# Patient Record
Sex: FEMALE | URBAN - METROPOLITAN AREA
[De-identification: names, ages, dates, MRNs, and addresses within clinical notes are randomized per-mention and may not be internally consistent; named-entity substitution may affect disease eponyms.]

---

## 2018-10-05 ENCOUNTER — APPOINTMENT (RX ONLY)
Dept: URBAN - METROPOLITAN AREA CLINIC 20 | Facility: CLINIC | Age: 83
Setting detail: DERMATOLOGY
End: 2018-10-05

## 2018-10-05 DIAGNOSIS — L57.8 OTHER SKIN CHANGES DUE TO CHRONIC EXPOSURE TO NONIONIZING RADIATION: ICD-10-CM

## 2018-10-05 DIAGNOSIS — D18.0 HEMANGIOMA: ICD-10-CM

## 2018-10-05 DIAGNOSIS — L81.4 OTHER MELANIN HYPERPIGMENTATION: ICD-10-CM

## 2018-10-05 DIAGNOSIS — L82.1 OTHER SEBORRHEIC KERATOSIS: ICD-10-CM

## 2018-10-05 DIAGNOSIS — D22 MELANOCYTIC NEVI: ICD-10-CM

## 2018-10-05 PROBLEM — D18.01 HEMANGIOMA OF SKIN AND SUBCUTANEOUS TISSUE: Status: ACTIVE | Noted: 2018-10-05

## 2018-10-05 PROBLEM — D22.9 MELANOCYTIC NEVI, UNSPECIFIED: Status: ACTIVE | Noted: 2018-10-05

## 2018-10-05 PROCEDURE — 99214 OFFICE O/P EST MOD 30 MIN: CPT

## 2018-10-05 PROCEDURE — ? COUNSELING

## 2018-10-05 PROCEDURE — ? SUNSCREEN RECOMMENDATIONS

## 2018-10-05 NOTE — PROCEDURE: SUNSCREEN RECOMMENDATIONS
General Sunscreen Counseling: I recommended a broad spectrum sunscreen with at least SPF of 30, but higher is better.  I explained that SPF 30 sunscreens block approximately 97 percent of the sun's harmful rays in vitro, but in practice a higher SPF offers more protection from sun exposure as most people don't use the density of application to get the number on the body.  Sunscreens should be applied at least 15 minutes prior to expected sun exposure and then every 2 hours after that as long as sun exposure continues. If swimming or exercising sunscreen should be reapplied more frequently.  One ounce, or 30 fluid ounces (the equivalent of a shot glass full of sunscreen), is adequate to protect the skin not covered by a bathing suit. I also recommended a lip balm with a sunscreen as well. Sun protective clothing (UPF50+) can be used in lieu of sunscreen but must be worn the entire time you are exposed to the sun's rays.
Detail Level: Generalized
Products Recommended: Neutrogena SPF stick, aquaphor SPF 30 lip

## 2019-01-01 ENCOUNTER — APPOINTMENT (RX ONLY)
Dept: URBAN - METROPOLITAN AREA CLINIC 20 | Facility: CLINIC | Age: 84
Setting detail: DERMATOLOGY
End: 2019-01-01

## 2019-01-01 DIAGNOSIS — D69.2 OTHER NONTHROMBOCYTOPENIC PURPURA: ICD-10-CM

## 2019-01-01 DIAGNOSIS — I87.33 CHRONIC VENOUS HYPERTENSION (IDIOPATHIC) WITH ULCER AND INFLAMMATION: ICD-10-CM

## 2019-01-01 DIAGNOSIS — L81.4 OTHER MELANIN HYPERPIGMENTATION: ICD-10-CM

## 2019-01-01 DIAGNOSIS — L82.1 OTHER SEBORRHEIC KERATOSIS: ICD-10-CM

## 2019-01-01 DIAGNOSIS — I87.33 CHRONIC VENOUS HYPERTENSION (IDIOPATHIC) WITH ULCER AND INFLAMMATION: ICD-10-CM | Status: RESOLVING

## 2019-01-01 DIAGNOSIS — D22 MELANOCYTIC NEVI: ICD-10-CM

## 2019-01-01 DIAGNOSIS — D18.0 HEMANGIOMA: ICD-10-CM

## 2019-01-01 DIAGNOSIS — R60.0 LOCALIZED EDEMA: ICD-10-CM

## 2019-01-01 PROCEDURE — ? COUNSELING

## 2019-01-01 PROCEDURE — 99212 OFFICE O/P EST SF 10 MIN: CPT

## 2019-01-01 PROCEDURE — ? ADDITIONAL NOTES

## 2019-01-01 PROCEDURE — 99214 OFFICE O/P EST MOD 30 MIN: CPT

## 2019-01-01 PROCEDURE — ? DIAGNOSIS COMMENT

## 2019-01-01 ASSESSMENT — LOCATION ZONE DERM
LOCATION ZONE: FEET
LOCATION ZONE: LEG
LOCATION ZONE: FEET
LOCATION ZONE: ARM

## 2019-01-01 ASSESSMENT — LOCATION DETAILED DESCRIPTION DERM
LOCATION DETAILED: RIGHT DORSAL FOOT
LOCATION DETAILED: LEFT PROXIMAL PRETIBIAL REGION
LOCATION DETAILED: LEFT DORSAL FOOT
LOCATION DETAILED: LEFT DISTAL POSTERIOR UPPER ARM
LOCATION DETAILED: RIGHT DORSAL FOOT
LOCATION DETAILED: LEFT DISTAL PRETIBIAL REGION
LOCATION DETAILED: LEFT DORSAL FOOT
LOCATION DETAILED: LEFT PROXIMAL DORSAL FOREARM

## 2019-01-01 ASSESSMENT — LOCATION SIMPLE DESCRIPTION DERM
LOCATION SIMPLE: RIGHT FOOT
LOCATION SIMPLE: RIGHT FOOT
LOCATION SIMPLE: LEFT FOOT
LOCATION SIMPLE: LEFT FOOT
LOCATION SIMPLE: LEFT FOREARM
LOCATION SIMPLE: LEFT PRETIBIAL REGION
LOCATION SIMPLE: LEFT POSTERIOR UPPER ARM

## 2019-09-06 PROBLEM — I87.333 CHRONIC VENOUS HYPERTENSION (IDIOPATHIC) WITH ULCER AND INFLAMMATION OF BILATERAL LOWER EXTREMITY: Status: ACTIVE | Noted: 2019-01-01

## 2019-09-06 PROBLEM — D69.2 OTHER NONTHROMBOCYTOPENIC PURPURA: Status: ACTIVE | Noted: 2019-01-01

## 2019-09-06 PROBLEM — R60.0 LOCALIZED EDEMA: Status: ACTIVE | Noted: 2019-01-01

## 2019-09-06 NOTE — HPI: DISCOLORATION
How Severe Is Your Skin Discoloration?: moderate
Additional History: Pt has been on same medication for 10 plus years nothing has changed medication wise. Pt is under care for infection of her feet.

## 2019-09-06 NOTE — PROCEDURE: ADDITIONAL NOTES
Detail Level: Simple
Additional Notes: Dorsal feet ulcers healing with fibrin base. Needs wound care and diuresis. Hx CHF and multiple myeloma, in RG. Under care of [facility]CP. Transferred care to NOLAN Orta from this appointment today.

## 2019-10-04 PROBLEM — D18.01 HEMANGIOMA OF SKIN AND SUBCUTANEOUS TISSUE: Status: ACTIVE | Noted: 2019-01-01

## 2019-10-04 PROBLEM — D22.9 MELANOCYTIC NEVI, UNSPECIFIED: Status: ACTIVE | Noted: 2019-01-01

## 2019-10-04 PROBLEM — L81.4 OTHER MELANIN HYPERPIGMENTATION: Status: ACTIVE | Noted: 2019-01-01

## 2019-10-04 PROBLEM — L82.1 OTHER SEBORRHEIC KERATOSIS: Status: ACTIVE | Noted: 2019-01-01

## 2019-10-04 PROBLEM — D69.2 OTHER NONTHROMBOCYTOPENIC PURPURA: Status: ACTIVE | Noted: 2019-01-01

## 2019-10-04 PROBLEM — L30.9 DERMATITIS, UNSPECIFIED: Status: ACTIVE | Noted: 2019-01-01

## 2019-10-04 NOTE — PROCEDURE: ADDITIONAL NOTES
Detail Level: Simple
Additional Notes: Dorsal feet ulcers healing with fibrin base. Needs wound care and diuresis. Hx CHF and multiple myeloma, in RG. Under care of [facility]CP. Transferred care to NOLAN Orta from this appointment today.
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